# Patient Record
Sex: MALE | Race: WHITE | NOT HISPANIC OR LATINO | URBAN - METROPOLITAN AREA
[De-identification: names, ages, dates, MRNs, and addresses within clinical notes are randomized per-mention and may not be internally consistent; named-entity substitution may affect disease eponyms.]

---

## 2017-12-03 ENCOUNTER — EMERGENCY (EMERGENCY)
Facility: HOSPITAL | Age: 37
LOS: 0 days | Discharge: HOME | End: 2017-12-03
Attending: EMERGENCY MEDICINE | Admitting: EMERGENCY MEDICINE
Payer: OTHER GOVERNMENT

## 2017-12-03 VITALS
DIASTOLIC BLOOD PRESSURE: 71 MMHG | RESPIRATION RATE: 16 BRPM | SYSTOLIC BLOOD PRESSURE: 129 MMHG | HEART RATE: 86 BPM | OXYGEN SATURATION: 100 %

## 2017-12-03 VITALS — WEIGHT: 199.96 LBS | HEIGHT: 69 IN

## 2017-12-03 DIAGNOSIS — M54.5 LOW BACK PAIN: ICD-10-CM

## 2017-12-03 DIAGNOSIS — M54.31 SCIATICA, RIGHT SIDE: ICD-10-CM

## 2017-12-03 PROCEDURE — 99284 EMERGENCY DEPT VISIT MOD MDM: CPT

## 2017-12-03 PROCEDURE — 72131 CT LUMBAR SPINE W/O DYE: CPT | Mod: 26

## 2017-12-03 RX ORDER — TRAMADOL HYDROCHLORIDE 50 MG/1
1 TABLET ORAL
Qty: 8 | Refills: 0 | OUTPATIENT
Start: 2017-12-03

## 2017-12-03 RX ORDER — KETOROLAC TROMETHAMINE 30 MG/ML
60 SYRINGE (ML) INJECTION ONCE
Qty: 0 | Refills: 0 | Status: DISCONTINUED | OUTPATIENT
Start: 2017-12-03 | End: 2017-12-03

## 2017-12-03 RX ORDER — MORPHINE SULFATE 50 MG/1
4 CAPSULE, EXTENDED RELEASE ORAL ONCE
Qty: 0 | Refills: 0 | Status: DISCONTINUED | OUTPATIENT
Start: 2017-12-03 | End: 2017-12-03

## 2017-12-03 RX ADMIN — MORPHINE SULFATE 4 MILLIGRAM(S): 50 CAPSULE, EXTENDED RELEASE ORAL at 12:01

## 2017-12-03 RX ADMIN — Medication 60 MILLIGRAM(S): at 10:42

## 2017-12-03 NOTE — ED PROVIDER NOTE - MEDICAL DECISION MAKING DETAILS
As he had recent manipulation and known disc disease, will have CT evaluation.  Analgesics, muscle relaxer. Re-eval.

## 2017-12-03 NOTE — ED PROVIDER NOTE - CARE PLAN
Principal Discharge DX:	Acute right-sided low back pain with sciatica, sciatica laterality unspecified

## 2017-12-03 NOTE — ED ADULT NURSE NOTE - OBJECTIVE STATEMENT
pt presents to ED from home, alert and orientedx4, VSS afebrile, pt c/o lwoer back pain since monday, pt denies fall trauma or heavy lifting. pt states that he went to [place] had a long car ride, and played golf on his trip. pt states he has a hx of lumbar issue and has had physical therapy in the past. pt states he has taken aleve, motrin, muscle relaxors with no relief. safety maintained, pt ambulatory, will continue to monitor

## 2017-12-03 NOTE — ED PROVIDER NOTE - OBJECTIVE STATEMENT
36 y/o male presents to the ED c/o back pain, radiating down his left leg.  Pt drove to [place] last week and was active.  He woke up the next day and back began to hurt.  After making the drive home he could not take pain anymore so came to ED.  He has tried Aleve, Ibuprofen, Motrin, Flexeril without sustained relief.  Pt went to a chiropractor last week which provided temporary relief, but pain returned.  Pain radiates down left leg.  Pt notes he has chronic back pain but   [facility]  Pt has not taken anything today for pain. 38 y/o male presents to the ED c/o back pain for 1 week, with pain radiating down his left leg.  Pt drove to [place] last week for vacation and was active.  He woke up the next day and back began to hurt.  After making the drive home he could not take pain anymore so came to ED.  Denies bowel or bladder dysfunction.  He has tried Aleve, Ibuprofen, Motrin, Flexeril without sustained relief.  Pt went to a chiropractor last week which provided temporary relief, but pain returned.  Pain radiates down left leg.  Pt notes he has chronic back pain but has not had recent imaging.  [facility]  Pt has not taken anything today for pain. 36 y/o male with PMHx of lumbar disc disease presents to the ED c/o back pain for 1 week, with pain radiating down his left leg.  Pt drove to [place] last week for vacation and was active.  He woke up the next day and back began to hurt.  After making the drive home he could not take pain anymore so came to ED.  Denies bowel or bladder dysfunction.  He has tried Aleve, Ibuprofen, Motrin, Flexeril without sustained relief.  Pt went to a chiropractor last week which provided temporary relief, but pain returned.  Pain radiates down left leg.  Pt notes he has chronic back pain but has not had recent imaging.  [facility].  Pt has not taken anything today for pain.

## 2019-03-24 ENCOUNTER — EMERGENCY (EMERGENCY)
Facility: HOSPITAL | Age: 39
LOS: 0 days | Discharge: HOME | End: 2019-03-24
Attending: EMERGENCY MEDICINE | Admitting: EMERGENCY MEDICINE
Payer: OTHER GOVERNMENT

## 2019-03-24 VITALS
OXYGEN SATURATION: 98 % | RESPIRATION RATE: 16 BRPM | HEART RATE: 72 BPM | TEMPERATURE: 98 F | DIASTOLIC BLOOD PRESSURE: 94 MMHG | SYSTOLIC BLOOD PRESSURE: 126 MMHG

## 2019-03-24 VITALS — HEIGHT: 69 IN | WEIGHT: 210.1 LBS

## 2019-03-24 DIAGNOSIS — R60.0 LOCALIZED EDEMA: ICD-10-CM

## 2019-03-24 DIAGNOSIS — M79.10 MYALGIA, UNSPECIFIED SITE: ICD-10-CM

## 2019-03-24 DIAGNOSIS — Z79.899 OTHER LONG TERM (CURRENT) DRUG THERAPY: ICD-10-CM

## 2019-03-24 DIAGNOSIS — I87.2 VENOUS INSUFFICIENCY (CHRONIC) (PERIPHERAL): ICD-10-CM

## 2019-03-24 PROCEDURE — 99284 EMERGENCY DEPT VISIT MOD MDM: CPT

## 2019-03-24 PROCEDURE — 93971 EXTREMITY STUDY: CPT | Mod: 26,RT

## 2019-03-24 RX ORDER — ESOMEPRAZOLE MAGNESIUM 40 MG/1
0 CAPSULE, DELAYED RELEASE ORAL
Qty: 0 | Refills: 0 | COMMUNITY

## 2019-03-24 NOTE — ED PROVIDER NOTE - NSFOLLOWUPINSTRUCTIONS_ED_ALL_ED_FT
Follow-up with your regular physician  Leg elevation/compression stockings may help reduce swelling.  Return with any worsening/new symptoms Follow-up with your regular physician  Leg elevation/compression stockings may help reduce swelling.  Return with any worsening/new symptoms      Leg Edema    WHAT YOU NEED TO KNOW:    Leg edema is swelling caused by fluid buildup. Your legs may swell if you sit or stand for long periods of time, are pregnant, or are injured. Swelling may also occur if you have heart failure or circulation problems. This means that your heart does not pump blood through your body as it should.    DISCHARGE INSTRUCTIONS:    Self-care:   Elevate your legs: Raise your legs above the level of your heart as often as you can. This will help decrease swelling and pain. Prop your legs on pillows or blankets to keep them elevated comfortably.  Wear pressure stockings: These tight stockings put pressure on your legs to promote blood flow and prevent blood clots. Wear the stockings during the day. Do not wear them while you sleep.  Apply heat: Heat helps decrease pain and swelling. Apply heat on the area for 20 to 30 minutes every 2 hours for as many days as directed.   Stay active: Do not stand or sit for long periods of time. Ask your healthcare provider about the best exercise plan for you.  Eat healthy foods: Healthy foods include fruits, vegetables, whole-grain breads, low-fat dairy products, beans, lean meats, and fish. Ask if you need to be on a special diet. Limit salt. Salt will make your body hold even more fluid.    Follow up with your healthcare provider as directed: Write down your questions so you remember to ask them during your visits.     Contact your healthcare provider if:     You have a fever or feel more tired than usual.  The veins in your legs look larger than usual. They may look full or bulging.  Your legs itch or feel heavy.  You have red or white areas or sores on your legs. The skin may also appear dimpled or have indentations.  You are gaining weight.  You have trouble moving your ankles.  The swelling does not go away, or other parts of your body swell.  You have questions or concerns about your condition or care.    Return to the emergency department if:   You cannot walk.  You feel faint or confused.   Your skin turns blue or gray.  Your leg feels warm, tender, and painful. It may be swollen and red.  You have chest pain or trouble breathing that is worse when you lie down.  You suddenly feel lightheaded and have trouble breathing.  You have new and sudden chest pain. You may have more pain when you take deep breaths or cough. You may also cough up blood.

## 2019-03-24 NOTE — ED ADULT TRIAGE NOTE - CHIEF COMPLAINT QUOTE
Pt ambulatory to triage with c/o RLE pain, swelling x 3 weeks. Pt states if he flexes his ankle he feels "fatigue" in his calf. Advised by his sister to go to ED for evaluation. No sig PMHx per patient

## 2019-03-24 NOTE — ED PROVIDER NOTE - OBJECTIVE STATEMENT
38M without signif PMH c/o right lower extremity swelling, mild achy discomfort x 3wks. No hx PE/DVT. No risk factors.

## 2020-09-18 NOTE — ED ADULT NURSE NOTE - SCORE
Consulted for multiple wounds; per primary RN pt's prognosis poor; attempting to speak to family and palliative care involved; will return this afternoon after decisions have been made. 1

## 2021-09-22 ENCOUNTER — IMMUNIZATION (OUTPATIENT)
Dept: PHARMACY | Facility: CLINIC | Age: 41
End: 2021-09-22
Payer: OTHER GOVERNMENT

## 2021-09-22 DIAGNOSIS — Z23 NEED FOR VACCINATION: Primary | ICD-10-CM

## 2022-04-07 ENCOUNTER — OFFICE VISIT (OUTPATIENT)
Dept: SPINE | Facility: CLINIC | Age: 42
End: 2022-04-07
Payer: OTHER GOVERNMENT

## 2022-04-07 ENCOUNTER — HOSPITAL ENCOUNTER (OUTPATIENT)
Dept: RADIOLOGY | Facility: OTHER | Age: 42
Discharge: HOME | End: 2022-04-07
Attending: STUDENT IN AN ORGANIZED HEALTH CARE EDUCATION/TRAINING PROGRAM
Payer: OTHER GOVERNMENT

## 2022-04-07 ENCOUNTER — TELEPHONE (OUTPATIENT)
Dept: ADMINISTRATIVE | Facility: OTHER | Age: 42
End: 2022-04-07
Payer: OTHER GOVERNMENT

## 2022-04-07 VITALS
DIASTOLIC BLOOD PRESSURE: 104 MMHG | HEIGHT: 69 IN | SYSTOLIC BLOOD PRESSURE: 150 MMHG | WEIGHT: 207 LBS | RESPIRATION RATE: 18 BRPM | HEART RATE: 87 BPM | BODY MASS INDEX: 30.66 KG/M2 | TEMPERATURE: 98 F | OXYGEN SATURATION: 99 %

## 2022-04-07 DIAGNOSIS — M47.816 SPONDYLOSIS WITHOUT MYELOPATHY OR RADICULOPATHY, LUMBAR REGION: ICD-10-CM

## 2022-04-07 DIAGNOSIS — M79.18 MYOFASCIAL PAIN: ICD-10-CM

## 2022-04-07 DIAGNOSIS — M47.812 SPONDYLOSIS WITHOUT MYELOPATHY OR RADICULOPATHY, CERVICAL REGION: Primary | ICD-10-CM

## 2022-04-07 PROCEDURE — 72114 XR LUMBAR SPINE 5 VIEW WITH FLEX AND EXT: ICD-10-PCS | Mod: 26,,, | Performed by: RADIOLOGY

## 2022-04-07 PROCEDURE — 99215 OFFICE O/P EST HI 40 MIN: CPT | Mod: PBBFAC | Performed by: ANESTHESIOLOGY

## 2022-04-07 PROCEDURE — 99999 PR PBB SHADOW E&M-EST. PATIENT-LVL V: CPT | Mod: PBBFAC,,, | Performed by: ANESTHESIOLOGY

## 2022-04-07 PROCEDURE — 72114 X-RAY EXAM L-S SPINE BENDING: CPT | Mod: TC,FY

## 2022-04-07 PROCEDURE — 99999 PR PBB SHADOW E&M-EST. PATIENT-LVL V: ICD-10-PCS | Mod: PBBFAC,,, | Performed by: ANESTHESIOLOGY

## 2022-04-07 PROCEDURE — 72114 X-RAY EXAM L-S SPINE BENDING: CPT | Mod: 26,,, | Performed by: RADIOLOGY

## 2022-04-07 PROCEDURE — 99244 PR OFFICE CONSULTATION,LEVEL IV: ICD-10-PCS | Mod: S$PBB,,, | Performed by: ANESTHESIOLOGY

## 2022-04-07 PROCEDURE — 99244 OFF/OP CNSLTJ NEW/EST MOD 40: CPT | Mod: S$PBB,,, | Performed by: ANESTHESIOLOGY

## 2022-04-07 NOTE — PROGRESS NOTES
Chronic Pain - New Consult    Referring Physician: Kiran Collins MD    Chief Complaint:   Chief Complaint   Patient presents with    Low-back Pain     Pt states low back pain on a regular basis    Neck Pain        SUBJECTIVE:    Chito Palacios presents to the clinic for the evaluation of chronic low back and subacute neck pain. 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 His pain has fluctuated over the years but recently symptoms have been worsening.The pain is located in the low back area and radiates to the buttock. He previously had shooting pain extending down the posterior leg to the shin but this has resolved. The pain is described as aching, sharp and stabbing. Symptoms interfere with daily activity, sleeping and work. The pain is exacerbated by Night Time, Extension and Getting out of bed/chair.  The pain is mitigated by massage, medications and physical therapy. He reports spending 4 hours per day reclining. The patient reports 5 hours of uninterrupted sleep per night.    His neck pain began 2-3 months ago and is described as tight and sharp along the bilateral upper trapezius. Does not radiate to the arms or associated with weakness. This has occurred since he began predominately desk work due to his back pain. Worse with neck rotation and extension. He has not tried anything for this pain as of yet. Currently this is more bothersome than back pain.    Patient denies night fever/night sweats, urinary incontinence, bowel incontinence, significant weight loss, significant motor weakness and loss of sensations.    Physical Therapy/Home Exercise:   Yes, completed 6 weeks within the last 6 months      Pain Disability Index Review:  Last 3 PDI Scores 4/7/2022   Pain Disability Index (PDI) 19       Pain Medications:    - Adjuvant Medications: Naproxen and flexeril  - Anti-Coagulants: None     report:  Not applicable    Pain Procedures:   None    Imaging:   MRI lumbar  "spine 8/26/2021  Central disk protrusion at L5/S1 with annular tear without spinal or foraminal stenosis. Full report upload to media tab.    No past medical history on file.  No past surgical history on file.  Social History     Socioeconomic History    Marital status:    Tobacco Use    Smoking status: Former Smoker     Types: Cigarettes    Smokeless tobacco: Former User     Quit date: 2012   Substance and Sexual Activity    Alcohol use: Never     Comment: occassionally    Drug use: Never    Sexual activity: Yes     Partners: Female     Birth control/protection: None     No family history on file.    Review of patient's allergies indicates:  No Known Allergies    No current outpatient medications on file.     No current facility-administered medications for this visit.       REVIEW OF SYSTEMS:    GENERAL:  No weight loss, malaise or fevers.  HEENT:  Negative for frequent or significant headaches.  NECK:  Negative for lumps, goiter, pain and significant neck swelling.  RESPIRATORY:  Negative for cough, wheezing or shortness of breath.  CARDIOVASCULAR:  Negative for chest pain, leg swelling or palpitations.  GI:  Negative for abdominal discomfort, blood in stools or black stools or change in bowel habits.  MUSCULOSKELETAL:  See HPI.  SKIN:  Negative for lesions, rash, and itching.  PSYCH:  Negative for sleep disturbance, mood disorder and recent psychosocial stressors.  HEMATOLOGY/LYMPHOLOGY:  Negative for prolonged bleeding, bruising easily or swollen nodes.  NEURO:   No history of headaches, syncope, paralysis, seizures or tremors.  All other reviewed and negative other than HPI.    OBJECTIVE:    BP (!) 150/104 (BP Location: Left arm, Patient Position: Sitting, BP Method: X-Large (Automatic))   Pulse 87   Temp 98.1 °F (36.7 °C)   Resp 18   Ht 5' 9" (1.753 m)   Wt 93.9 kg (207 lb)   SpO2 99%   BMI 30.57 kg/m²     PHYSICAL EXAMINATION:    General appearance: Well appearing, in no acute distress, " alert and oriented x3.  Psych:  Mood and affect appropriate.  Skin: Skin color, texture, turgor normal, no rashes or lesions, in both upper and lower body.  Head/face:  Normocephalic, atraumatic. No palpable lymph nodes.  Neck: No pain to palpation over the cervical paraspinous muscles. Spurling Negative. No pain with neck flexion. Pain with extension and lateral flexion.   Cor: RRR  Pulm: CTA  GI:  Soft and non-tender.  Back: Straight leg raising in the sitting and supine positions is negative to radicular pain. Mild tenderness over the lumbar paraspinal muscles (left > right) and iliac crest. Facet joint loading positive bilaterally. Pain with extension.  Extremities: Peripheral joint ROM is full and pain free without obvious instability or laxity in all four extremities. No deformities, edema, or skin discoloration. Good capillary refill.  Musculoskeletal: Hip, sacroiliac and knee provocative maneuvers are negative. Bilateral upper and lower extremity strength is normal and symmetric.  No atrophy or tone abnormalities are noted.  Neuro: Bilateral upper and lower extremity coordination and muscle stretch reflexes are physiologic and symmetric.  Plantar response are downgoing. No loss of sensation is noted.  Gait: normal.    ASSESSMENT: 41 y.o. year old male with neck and back pain, consistent with:     1. Spondylosis without myelopathy or radiculopathy, cervical region  Ambulatory referral/consult to [facility]   2. Spondylosis without myelopathy or radiculopathy, lumbar region  Procedure Order to Pain Management    X-Ray Lumbar Complete Including Flex And Ext   3. Myofascial pain  Ambulatory referral/consult to [facility]         PLAN:     - Patient has had long standing low back pain consistent with facet arthropathy. Failed conservative measures.   - Continue current medications.  - Will schedule for bilateral L4/5 and L5/S1 facet joint injections. Patient offered MBB/RFA but declined due to  perceived invasiveness.  - Order lumbar flex/ext to r/o instability  - Referral sent to [facility] program for cervical spine strengthening and stabilization. May likely be beneficial for his low back once completed.   - RTC 2 weeks post-procedure  - Counseled patient regarding the importance of activity modification, constant sleeping habits and physical therapy.    The above plan and management options were discussed at length with patient. Patient is in agreement with the above and verbalized understanding. It will be communicated with the referring physician via electronic record, fax, or mail.    Nicolas Mcarthur  04/07/2022     I have reviewed and concur with the resident's history, physical, assessment, and plan.  I have personally interviewed and examined the patient at bedside.  See below addendum for my evaluation and additional findings.    Fab Colbert MD

## 2022-04-08 ENCOUNTER — TELEPHONE (OUTPATIENT)
Dept: ADMINISTRATIVE | Facility: OTHER | Age: 42
End: 2022-04-08
Payer: OTHER GOVERNMENT

## 2022-04-29 ENCOUNTER — HOSPITAL ENCOUNTER (OUTPATIENT)
Facility: OTHER | Age: 42
Discharge: HOME | End: 2022-04-29
Attending: ANESTHESIOLOGY | Admitting: ANESTHESIOLOGY
Payer: OTHER GOVERNMENT

## 2022-04-29 VITALS
HEIGHT: 67 IN | DIASTOLIC BLOOD PRESSURE: 88 MMHG | RESPIRATION RATE: 16 BRPM | WEIGHT: 200 LBS | HEART RATE: 89 BPM | BODY MASS INDEX: 31.39 KG/M2 | SYSTOLIC BLOOD PRESSURE: 131 MMHG | OXYGEN SATURATION: 97 % | TEMPERATURE: 99 F

## 2022-04-29 DIAGNOSIS — M47.9 SPONDYLOSIS: ICD-10-CM

## 2022-04-29 DIAGNOSIS — M47.9 OSTEOARTHRITIS OF SPINE, UNSPECIFIED SPINAL OSTEOARTHRITIS COMPLICATION STATUS, UNSPECIFIED SPINAL REGION: Primary | ICD-10-CM

## 2022-04-29 DIAGNOSIS — G89.29 CHRONIC PAIN: ICD-10-CM

## 2022-04-29 PROCEDURE — 64493 INJ PARAVERT F JNT L/S 1 LEV: CPT | Mod: 50 | Performed by: ANESTHESIOLOGY

## 2022-04-29 PROCEDURE — 64493 INJ PARAVERT F JNT L/S 1 LEV: CPT | Mod: 50,,, | Performed by: ANESTHESIOLOGY

## 2022-04-29 PROCEDURE — 64494 INJ PARAVERT F JNT L/S 2 LEV: CPT | Mod: 50 | Performed by: ANESTHESIOLOGY

## 2022-04-29 PROCEDURE — 25000003 PHARM REV CODE 250: Performed by: ANESTHESIOLOGY

## 2022-04-29 PROCEDURE — 63600175 PHARM REV CODE 636 W HCPCS: Performed by: ANESTHESIOLOGY

## 2022-04-29 PROCEDURE — 64493 PR INJ DX/THER AGNT PARAVERT FACET JOINT,IMG GUIDE,LUMBAR/SAC,1ST LVL: ICD-10-PCS | Mod: 50,,, | Performed by: ANESTHESIOLOGY

## 2022-04-29 PROCEDURE — 64494 INJ PARAVERT F JNT L/S 2 LEV: CPT | Mod: 50,,, | Performed by: ANESTHESIOLOGY

## 2022-04-29 PROCEDURE — 25500020 PHARM REV CODE 255: Performed by: ANESTHESIOLOGY

## 2022-04-29 PROCEDURE — 64494 PR INJ DX/THER AGNT PARAVERT FACET JOINT,IMG GUIDE,LUMBAR/SAC, 2ND LEVEL: ICD-10-PCS | Mod: 50,,, | Performed by: ANESTHESIOLOGY

## 2022-04-29 RX ORDER — DEXAMETHASONE SODIUM PHOSPHATE 10 MG/ML
INJECTION INTRAMUSCULAR; INTRAVENOUS
Status: DISCONTINUED | OUTPATIENT
Start: 2022-04-29 | End: 2022-04-29 | Stop reason: HOSPADM

## 2022-04-29 RX ORDER — LIDOCAINE HYDROCHLORIDE 20 MG/ML
INJECTION, SOLUTION INFILTRATION; PERINEURAL
Status: DISCONTINUED | OUTPATIENT
Start: 2022-04-29 | End: 2022-04-29 | Stop reason: HOSPADM

## 2022-04-29 RX ORDER — MIDAZOLAM HYDROCHLORIDE 1 MG/ML
INJECTION INTRAMUSCULAR; INTRAVENOUS
Status: DISCONTINUED | OUTPATIENT
Start: 2022-04-29 | End: 2022-04-29 | Stop reason: HOSPADM

## 2022-04-29 RX ORDER — BUPIVACAINE HYDROCHLORIDE 2.5 MG/ML
INJECTION, SOLUTION EPIDURAL; INFILTRATION; INTRACAUDAL
Status: DISCONTINUED | OUTPATIENT
Start: 2022-04-29 | End: 2022-04-29 | Stop reason: HOSPADM

## 2022-04-29 RX ORDER — FENTANYL CITRATE 50 UG/ML
INJECTION, SOLUTION INTRAMUSCULAR; INTRAVENOUS
Status: DISCONTINUED | OUTPATIENT
Start: 2022-04-29 | End: 2022-04-29 | Stop reason: HOSPADM

## 2022-04-29 RX ORDER — SODIUM CHLORIDE 9 MG/ML
INJECTION, SOLUTION INTRAVENOUS CONTINUOUS
Status: DISCONTINUED | OUTPATIENT
Start: 2022-04-29 | End: 2022-04-29 | Stop reason: HOSPADM

## 2022-04-29 NOTE — DISCHARGE INSTRUCTIONS
Thank you for allowing us to care for you today. You may receive a survey about the care we provided. Your feedback is valuable and helps us provide excellent care throughout the community.     Home Care Instructions for Pain Management:    1. DIET:   You may resume your normal diet today.   2. BATHING:   You may shower with luke warm water. No tub baths or anything that will soak injection sites under water for the next 24 hours.  3. DRESSING:   You may remove your bandage today.   4. ACTIVITY LEVEL:   You may resume your normal activities 24 hrs after your procedure. Nothing strenuous today.  5. MEDICATIONS:   You may resume your normal medications today. To restart blood thinners, ask your doctor.  6. DRIVING    If you have received any sedatives by mouth today, you may not drive for 12 hours.    If you have received any sedation through your IV, you may not drive for 24 hrs.   7. SPECIAL INSTRUCTIONS:   No heat to the injection site for 24 hrs including, hot bath or shower, heating pad, moist heat, or hot tubs.    Use ice pack to injection site for any pain or discomfort.  Apply ice packs for 20 minute intervals as needed.    IF you have diabetes, be sure to monitor your blood sugar more closely. IF your injection contained steroids your blood sugar levels may become higher than normal.    If you are still having pain upon discharge:  Your pain may improve over the next 48 hours. The anesthetic (numbing medication) works immediately to 48 hours. IF your injection contained a steroid (anti-inflammatory medication), it takes approximately 3 days to start feeling relief and 7-10 days to see your greatest results from the medication. It is possible you may need subsequent injections. This would be discussed at your follow up appointment with pain management or your referring doctor.    Please call the PAIN MANAGEMENT office at [phone] or ON CALL pager at [phone] if you experienced any:   -Weakness or  loss of sensation  -Fever > 101.5  -Pain uncontrolled with oral medications   -Persistent nausea, vomiting, or diarrhea  -Redness or drainage from the injection sites, or any other worrisome concerns.   If physician on call was not reached or could not communicate with our office for any reason please go to the nearest emergency department.     Adult Procedural Sedation Instructions    Recovery After Procedural Sedation (Adult)  You have been given medicine by vein to make you sleep during your surgery. This may have included both a pain medicine and sleeping medicine. Most of the effects have worn off. But you may still have some drowsiness for the next 6 to 8 hours.  Home care  Follow these guidelines when you get home:  For the next 8 hours, you should be watched by a responsible adult. This person should make sure your condition is not getting worse.  Don't drink any alcohol for the next 24 hours.  Don't drive, operate dangerous machinery, or make important business or personal decisions during the next 24 hours.  Note: Your healthcare provider may tell you not to take any medicine by mouth for pain or sleep in the next 4 hours. These medicines may react with the medicines you were given in the hospital. This could cause a much stronger response than usual.  Follow-up care  Follow up with your healthcare provider if you are not alert and back to your usual level of activity within 12 hours.  When to seek medical advice  Call your healthcare provider right away if any of these occur:  Drowsiness gets worse  Weakness or dizziness gets worse  Repeated vomiting  You can't be awakened   Date Last Reviewed: 10/18/2016  © 4687-4320 [employer]. [address] [place] [address], [place] [address]. All rights reserved. This information is not intended as a substitute for professional medical care. Always follow your healthcare professional's instructions.

## 2022-04-29 NOTE — OP NOTE
Therapeutic Lumbar Facet Joint Injection under Fluoroscopy     The procedure, risks, benefits, and options were discussed with the patient. There are no contraindications to the procedure. The patent expressed understanding and agreed to the procedure. Informed written consent was obtained prior to the start of the procedure and can be found in the patient's chart.    PATIENT NAME: Chito Palacios   MRN: [id number]     DATE OF PROCEDURE: 04/29/2022    PROCEDURE: Bilateral L4/5 and L5/S1 Lumbar Facet Joint Injection under Fluoroscopy      PRE-OP DIAGNOSIS: Spondylosis without myelopathy or radiculopathy, lumbar region [M47.816] Lumbar spondylosis [M47.816]    POST-OP DIAGNOSIS: Same    PHYSICIAN: Fab Colbert MD    ASSISTANTS: Lm Weiss MD    MEDICATIONS INJECTED: Preservative-free Decadron 10mg with 1cc of Bupivacaine 0.25%    LOCAL ANESTHETIC INJECTED: Xylocaine 2%     SEDATION:   Versed 2mg and Fentanyl 50mcg                                                                                                                                                                                     Conscious sedation ordered by M.D. Patient re-evaluation prior to administration of conscious sedation. No changes noted in patient's status from initial evaluation. The patient's vital signs were monitored by RN and patient remained hemodynamically stable throughout the procedure.    Event Time In   Sedation Start 1015       ESTIMATED BLOOD LOSS: None    COMPLICATIONS: None    TECHNIQUE: Time-out was performed to identify the patient and procedure to be performed. With the patient laying in a prone position, the surgical area was prepped and draped in the usual sterile fashion using ChloraPrep and a fenestrated drape. The level was determined under fluoroscopy guidance. Skin anesthesia was achieved by injecting Lidocaine 2% over the injection sites. A 25 gauge, 3.5 inch needle was introduced to each level using AP, lateral and/or  contralateral oblique fluoroscopic imaging. Contrast dye, Omnipaque (300mg/ml), was given until dye spread was in the distribution of the facet joint without any intravascular spread. After negative aspiration for blood or CSF was confirmed, 1 mL of the medication mixture listed above was injected slowly into each joint with displacement of the contrast confirming that the medication went into the distribution of the facet joints. The needles were removed and bleeding was nil. A sterile dressing was applied. No specimens collected. The patient tolerated the procedure well.    The patient was monitored after the procedure in the recovery area. They were given post-procedure and discharge instructions to follow at home. The patient was discharged in a stable condition.    I reviewed and edited the fellow's note. I conducted my own interview and physical examination. I agree with the findings. I was present and supervising all critical portions of the procedure.    Salvador Weiss MD    I have reviewed the notes, assessments, and/or procedures performed by fellow, I concur with her/his documentation of Chito Palaciso.    Fab Colbert MD

## 2022-04-29 NOTE — DISCHARGE SUMMARY
Discharge Note  Short Stay      SUMMARY     Admit Date: 4/29/2022    Attending Physician: Salvador Weiss      Discharge Physician: Salvador Weiss      Discharge Date: 4/29/2022 10:20 AM    Procedure(s) (LRB):  INJECTION, FACET JOINT, BILATERAL, L4-L5 and L5-S1 (Bilateral)    Final Diagnosis: Spondylosis without myelopathy or radiculopathy, lumbar region [M47.816]    Disposition: Home or self care    Patient Instructions:   There are no discharge medications for this patient.          Discharge Diagnosis: Spondylosis without myelopathy or radiculopathy, lumbar region [M47.816]  Condition on Discharge: Stable with no complications to procedure   Diet on Discharge: Same as before.  Activity: as per instruction sheet.  Discharge to: Home with a responsible adult.  Follow up: 2-4 weeks       Please call my office or pager at [phone] if experienced any weakness or loss of sensation, fever > 101.5, pain uncontrolled with oral medications, persistent nausea/vomiting/or diarrhea, redness or drainage from the incisions, or any other worrisome concerns. If physician on call was not reached or could not communicate with our office for any reason please go to the nearest emergency department        Salvador Weiss MD

## 2022-05-17 ENCOUNTER — CLINICAL SUPPORT (OUTPATIENT)
Dept: REHABILITATION | Facility: HOSPITAL | Age: 42
End: 2022-05-17
Attending: STUDENT IN AN ORGANIZED HEALTH CARE EDUCATION/TRAINING PROGRAM
Payer: OTHER GOVERNMENT

## 2022-05-17 ENCOUNTER — DOCUMENTATION ONLY (OUTPATIENT)
Dept: REHABILITATION | Facility: HOSPITAL | Age: 42
End: 2022-05-17

## 2022-05-17 DIAGNOSIS — R29.898 DECREASED RANGE OF MOTION OF NECK: ICD-10-CM

## 2022-05-17 DIAGNOSIS — M79.18 MYOFASCIAL PAIN: ICD-10-CM

## 2022-05-17 DIAGNOSIS — M47.812 SPONDYLOSIS WITHOUT MYELOPATHY OR RADICULOPATHY, CERVICAL REGION: ICD-10-CM

## 2022-05-17 PROCEDURE — 97110 THERAPEUTIC EXERCISES: CPT | Mod: PN | Performed by: PHYSICAL MEDICINE & REHABILITATION

## 2022-05-17 PROCEDURE — 97162 PT EVAL MOD COMPLEX 30 MIN: CPT | Mod: PN | Performed by: PHYSICAL MEDICINE & REHABILITATION

## 2022-05-17 NOTE — PROGRESS NOTES
Physical therapist and physical therapy assistant(s) met face to face to discuss patient's treatment plan and progress towards established goals. Pt will be seen by a physical therapist minimally every 6th visit or every 30 days.    Adeola Koehler, PT

## 2022-05-17 NOTE — PLAN OF CARE
[facility] - PHYSICAL THERAPY EVALUATION     Name: Chito Palacios  Clinic Number: [id number]    Therapy Diagnosis:   Encounter Diagnoses   Name Primary?    Spondylosis without myelopathy or radiculopathy, cervical region     Myofascial pain     Decreased range of motion of neck      Physician: Nicolas Mcarthur MD    Physician Orders: PT Eval and Treat    Medical Diagnosis from Referral:   M47.812 (ICD-10-CM) - Spondylosis without myelopathy or radiculopathy, cervical region   M79.18 (ICD-10-CM) - Myofascial pain       Evaluation Date: 5/17/2022  Authorization Period Expiration: 4/7/23  Plan of Care Expiration: 8/17/22  Reassessment Due: 6/17/22  Visit # / Visits authorized: 1/ 1    Time In: 10:30 am  Time Out: 12:04 pm  Total Billable Time: 90 minutes    Precautions:  Standard    Pattern of pain determined: 1 REP      Subjective   Date of onset: 6 months ago for neck, long standing for back  History of current condition - Chito reports: 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  Back pain still present.  [facility]   He is here now for neck. Neck pain started about 6 months ago, he noted stiffness first.  Stiffness progressed and then he started to have shooting pain in his neck.  He has seen his doctor, he saw the chiropractor.  He was told his discs were compressed by the chiropractor.  Neck pain is concentrated in base of neck.  No arm symptoms.  Neck pain is constant, 6/10 at worst, 2/10 now.  2/10 at best.  Worst in the morning, and stiff.  Loosens up once he moves around a little.  Worse with sitting at a desk.  He works for [employer] and sits at desk all day.  The pain is described as aching, sharp and stabbing.      His neck pain began 2-3 months ago and is described as tight and sharp along the bilateral upper trapezius. Does not radiate to the arms or associated with weakness. This has occurred  since he began predominately desk work due to his back pain. Worse with neck rotation and extension. He has not tried anything for this pain as of yet. Currently this is more bothersome than back pain.     Patient denies night fever/night sweats, urinary incontinence, bowel incontinence, significant weight loss, significant motor weakness and loss of sensations.     Physical Therapy/Home Exercise:   Yes, completed 6 weeks within the last 6 months       Medical History:   No past medical history on file.    Surgical History:   Chito Palacios  has a past surgical history that includes Injection of facet joint (Bilateral, 4/29/2022).    Medications:   Chito currently has no medications in their medication list.    Allergies:   Review of patient's allergies indicates:  No Known Allergies       Prior Therapy: yes  Prior Treatment: injections  Social History: lives here, wife and son in [place], he travels a fair bit  Occupation: [employer], sits at desk most of day  Leisure: golf, few weeks ago, seems ok      Prior Level of Function: full  Current Level of Function: full  DME owned/used:  [employer]        Pain:  Current 2/10, worst 6/10, best 2/10   Location: bilateral neck   Description: Aching and Sharp  Aggravating Factors: Morning and Extension  Easing Factors: rest  Disturbed Sleep: no        Pattern of pain questions:  1.  Where is your pain the worst? neck  2.  Is your pain constant or intermittent? constant  3.  Does bending forward make your typical pain worse? yes  4.  Since the start of your neck pain, has there been a change in your bowel or bladder? no  5.  What can't you do now that you use to be able to do? Be pain free, works through pain      Pts goals: reduce pain      Red Flag Screening:   Cough  Sneeze  Strain: (--)  Bladder/ bowel: (--)  Falls: (--)  Night pain: (--)  Unexplained weight loss: (--)  General health: good    OBJECTIVE   Postural examination/scapula alignment:  Rounded shoulder and Head forward  Sitting: very poor posture  Standing: rounded shoulders  Correction of posture: better with lumbar roll    Range of Motion - MOVEMENT LOSS    ROM Loss   Flexion moderate loss   Extension major loss 42 degrees   Side bending Right major loss 20 degrees   Side bending Left major loss 18 degrees   Rotation Right moderate loss 60 degrees   Rotation Left moderate loss 61 degrees   Protraction minimal loss   Retraction  major loss       Upper Extremity Strength  (R) UE  (L) UE    Shoulder flexion: 5/5 Shoulder flexion: 5/5   Shoulder Abduction: 5/5 Shoulder abduction: 5/5   Elbow flexion: 5/5 Elbow flexion: 5/5   Elbow extension: 5/5 Elbow extension: 5/5   Wrist flexion: 5/5 Wrist flexion: 5/5   Wrist extension: 5/5 Wrist extension: 5/5    5/5 : 5/5     LE 5/5 strength  Single leg stance 10 sec  Functional screening full    NEUROLOGICAL SCREEN    Sensory deficit: intact    Special Tests:   Test Name  Testing Result   Compression (+)   Distraction (--)   Neural Tension Test (--)   Saddle Sensation (--)     Reflexes:    Left Right   Biceps  2+ 2+   Brachioradialis  2+ 2+   Clonus (--) (--)   Babinski (--) (--)       REPEATED TEST MOVEMENTS: neck pain 2/10 with movement loss rotation and side bend  Repeated Protraction in Sitting end range pain  pain during motion  no worse   Repeated Flexion in Sitting end range pain  no worse   Repeated Retraction in Sitting  end range pain  no worse   Repeated Retraction Extension in Sitting end range pain  no worse   Repeated retraction prone on stomach slightly reduced pain and noted improved mobility   Repeated thoracic ext in sitting end range pain  no worse       Baseline Isometric Testing on Med X equipment:  Testing administered by PT  Date of testin22  ROM 36-90 deg   Max Peak Torque 156    Min Peak Torque 116    Flex/Ext Ratio 1.3/1   % below normative data -58%     [facility] 2022   Visit Number 1   VAS Pain Rating  6   Retraction in Sitting 10   Retraction with Extension 5   Cervical Extension Seat Pad 0   Seat Adjustment 401   Top Dead Center 66   Counterweight 1.1   Cervical Flexion 90   Cervical Extension 36   Cervical Peak Torque 156   Ice - Z Lie (in min.) 10       GAIT:  Assistive Device used: none  Level of Assistance: independent  Patient displays the following gait deviations:  no gait deviations observed.         Limitation/Restriction for FOTO 5/17/22 Survey    Therapist reviewed FOTO scores for Chito Palacios on 5/17/2022.   FOTO documents entered into [employer] - see Media section.    Limitation Score: 49% limited  Goal < 35% limited             Treatment   Treatment Time In: 11:30 am  Treatment Time Out: 12:00 pm  Total Treatment time separate from Evaluation: 30 minutes      Chito received therapeutic exercises to develop/improve posture, lumbar/cervical ROM, strength and muscular endurance for 30 minutes including the following exercises:   Med x dynamic exercise and baseline IM testing        Written Home Exercises Provided: yes.  Stretches at work 2/day:  Neck retractions 10 reps  Neck retraction ext 5 reps  Thoracic extensions 10 reps over chair  At home 1/day:  Open books 10 reps  Prone retraction on elbows for neck retraction    Exercises were reviewed and Chito was able to demonstrate them prior to the end of the session.  Chito demonstrated good  understanding of the education provided.     See EMR under Patient Instructions for exercises provided 5/17/2022.      Education provided:   - Patient received education regarding proper posture and body mechanics.  Patient was given top [facility] Visit 1 handouts which discuss what to expect in therapy, the purpose and opportunity for health coaching, the program,  wellness when discharged from therapy, back education and care specifically for posture seated, standing, lifting correctly, components of exercise, importance of nutrition and hydration, and  importance of sleep.   Information on lumbar rolls provided.  Patient received education regarding proper posture and body mechanics.  - Keiko roll tried, recommended, and purchase information was provided.    - Patient received a handout regarding anticipated muscular soreness following the isometric test and strategies for management were reviewed with patient including stretching, using ice and scheduled rest.   - Patient received education on the [facility] program, purpose of the isometric test, progression of neck strengthening as well as wellness approach and systemic strengthening.  Details of the program were discussed.  Reviewed that patient should feel support/pressure from med ex restraints but no pain or discomfort and patient expressed understanding.      Chito received cold pack for 10 minutes to back and neck.    Assessment   Chito is a 41 y.o. male referred to [facility] with a medical diagnosis of   M47.812 (ICD-10-CM) - Spondylosis without myelopathy or radiculopathy, cervical region   M79.18 (ICD-10-CM) - Myofascial pain     Pt presents with neck dominant constant pain   that is worse in the morning, with sitting, and stiffness all planes of motion.  Neck pain slightly reduced and motion improved with retraction extension sequencing, especially prone neck retraction on elbows.   Indicating directional preference of retraction extension.   Upon physical assessment, pt demonstrates slouched posturing in sitting, neck weakness, trunk postural weakness,  and trunk  mobility deficits. Upon further local examination,thoracic rotation was limited significantly. Per cervical  MedX testing, pt was 58% below average in strength when compared to those of  same age/height/weight/gender. All of the above noted supports potential lumbar classification as a pattern 1 REP with recurrent/ or consistent symptoms, thus pt is a good candidate for the [facility] Program. Pt would benefit from neck and  trunk mobility training, stability training,  improved cardiovascular and muscular endurance, neuromuscular re-education for posture, coordination, and muscular recruitment and education on positional offloading techniques to decrease the intensity and frequency of flare-ups.    Pain Pattern: 1 REP       Pt prognosis is Good.   Pt will benefit from skilled outpatient Physical Therapy to address the deficits stated above and in the chart below, provide pt/family education, and to maximize pt's level of independence.     Plan of care discussed with patient: Yes  Pt's spiritual, cultural and educational needs considered and patient is agreeable to the plan of care and goals as stated below:     Anticipated Barriers for therapy: attending with work schedule    PT Evaluation Completed? Yes    Medical necessity is demonstrated by the following problem list.    Pt presents with the following impairments:     History  Co-morbidities and personal factors that may impact the plan of care Co-morbidities:   chronic pain    Personal Factors:   work style     moderate   Examination  Body Structures and Functions, activity limitations and participation restrictions that may impact the plan of care Body Regions:   neck    Body Systems:    ROM  strength  transfers  transitions  motor control    Participation Restrictions:   attending    Activity limitations:   Learning and applying knowledge  no deficits    General Tasks and Commands  no deficits    Communication  no deficits    Mobility  moving around using equipment (WC)  using transportation (bus, train, plane, car)  driving (bike, car, motorcycle)    Self care  looking after one's health    Domestic Life  doing house work (cleaning house, washing dishes, laundry)    Interactions/Relationships  no deficits    Life Areas  no deficits    Community and Social Life  no deficits         moderate   Clinical Presentation evolving clinical presentation with changing clinical  characteristics moderate   Decision Making/ Complexity Score: moderate       GOALS: Pt is in agreement with the following goals.    Short term goals: 6 weeks or 10 visits   1.  Pt will demonstratte increased cervical ROM as measured by med ex by 6 degrees from initial test which results in improved  ROM of neck for ease with ADLs and driving  (approp and ongoing)  2. Pt will demonstrate independence with reducing or controlling symptoms with ther ex, movement, or position independently, able to reduce pain 1-2 points on pain scale using strategies taught in therapy  (approp and ongoing)  3. Pt will demonstrate increased MedX average isometric strength value by 20% with  when compared to the initial testing resulting in improved ability to perform bending, lifting, and carrying activities safely, confidently.  (approp and ongoing)        Long term goals: 10 weeks or 20 visits  1. Pt will demonstratte increased cervical ROM as measured by med ex by 12 degrees from initial test which results in functional ROM of neck for ease with ADLs and driving  (approp and ongoing)  2. Pt will demonstrate increased MedX average isometric strength value  by 40% from initial test to improve ability to lift and carry, and sustain good posture while performing ADL's  (approp and ongoing)  3.Pt will demonstrate reduced pain and improved functional outcomes as reported on the FOTO by reaching a limitation score of < or = 35% or less in order to demonstrate subjective improvement in pt's condition.    (approp and ongoing)  4. Pt will demonstrate independence with reducing or controlling symptoms with ther ex, movement, or position independently, able to reduce pain 2-4 points on pain scale using strategies taught in therapy  (approp and ongoing)  5. Pt will demonstrate independence with the HEP at discharge.   (approp and ongoing)  6.  Reduce pain to intermittent and 4/10 at worst  (approp and ongoing)    Plan   Outpatient physical  "therapy 2x week for 10 weeks or 20 visits to include the following:   - Patient education  - Therapeutic exercise  - Manual therapy  - Performance testing   - Neuromuscular Re-education  - Therapeutic activity   - Modalities    Pt may be seen by PTA as part of the rehabilitation team.     Therapist: Adeola Koehler, PT  5/17/2022      "I certify the need for these services furnished under this plan of treatment and while under my care."    ____________________________________  Physician/Referring Practitioner    _______________  Date of Signature          "

## 2022-05-18 ENCOUNTER — TELEPHONE (OUTPATIENT)
Dept: PAIN MEDICINE | Facility: CLINIC | Age: 42
End: 2022-05-18
Payer: OTHER GOVERNMENT

## 2022-05-18 NOTE — TELEPHONE ENCOUNTER
This message is for patient in regards to his/her appointment 05/19/22 at 9:40am      [facility] Policy: For the safety of all patients and staff members.     Patient Visitor policy: During this visit we're asking all patients to only have one visitor over the age of 18yrs old to accompany to be seen by Luis Enrique Gillespie NP. If patient do not required assistance with their visit, we're asking all visitors to remain outside the waiting area.    Upon arriving to your schedule appointment; patients are required to wear a face mask, if patient do not have a face mask one will be provided entering the facility. If you have any questions or concerns please contact [phone]

## 2022-05-19 ENCOUNTER — OFFICE VISIT (OUTPATIENT)
Dept: PAIN MEDICINE | Facility: CLINIC | Age: 42
End: 2022-05-19
Payer: OTHER GOVERNMENT

## 2022-05-19 VITALS
HEART RATE: 79 BPM | HEIGHT: 67 IN | TEMPERATURE: 98 F | WEIGHT: 195.31 LBS | DIASTOLIC BLOOD PRESSURE: 93 MMHG | SYSTOLIC BLOOD PRESSURE: 133 MMHG | BODY MASS INDEX: 30.65 KG/M2

## 2022-05-19 DIAGNOSIS — M54.50 CHRONIC MIDLINE LOW BACK PAIN WITHOUT SCIATICA: Primary | ICD-10-CM

## 2022-05-19 DIAGNOSIS — G89.29 CHRONIC MIDLINE LOW BACK PAIN WITHOUT SCIATICA: Primary | ICD-10-CM

## 2022-05-19 PROCEDURE — 99213 OFFICE O/P EST LOW 20 MIN: CPT | Mod: PBBFAC | Performed by: NURSE PRACTITIONER

## 2022-05-19 PROCEDURE — 99214 OFFICE O/P EST MOD 30 MIN: CPT | Mod: S$PBB,,, | Performed by: NURSE PRACTITIONER

## 2022-05-19 PROCEDURE — 99214 PR OFFICE/OUTPT VISIT, EST, LEVL IV, 30-39 MIN: ICD-10-PCS | Mod: S$PBB,,, | Performed by: NURSE PRACTITIONER

## 2022-05-19 PROCEDURE — 99999 PR PBB SHADOW E&M-EST. PATIENT-LVL III: CPT | Mod: PBBFAC,,, | Performed by: NURSE PRACTITIONER

## 2022-05-19 PROCEDURE — 99999 PR PBB SHADOW E&M-EST. PATIENT-LVL III: ICD-10-PCS | Mod: PBBFAC,,, | Performed by: NURSE PRACTITIONER

## 2022-05-19 RX ORDER — LISINOPRIL 20 MG/1
20 TABLET ORAL DAILY
COMMUNITY
Start: 2022-02-26

## 2022-05-19 RX ORDER — NAPROXEN 500 MG/1
TABLET ORAL
COMMUNITY
Start: 2021-10-04

## 2022-05-19 NOTE — PROGRESS NOTES
Chronic Pain - New Consult    Referring Physician: No ref. provider found    Chief Complaint:   No chief complaint on file.       SUBJECTIVE:    Interval History 5/19/2022:  Mr Palacios presents for follow up of axial lower back pain.  He is status post bilateral L4 -5 and L5-S1 facet joint injection without any focal benefit not even temporary.  He denies any radicular pain.  He has just started physical therapy like to continue this before considering further procedures. There is no focal voicing of loss of b/b or saddle paresthesias.     Initial HPI:  Chito Palacios presents to the clinic for the evaluation of chronic low back and subacute neck pain. 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 His pain has fluctuated over the years but recently symptoms have been worsening.The pain is located in the low back area and radiates to the buttock. He previously had shooting pain extending down the posterior leg to the shin but this has resolved. The pain is described as aching, sharp and stabbing. Symptoms interfere with daily activity, sleeping and work. The pain is exacerbated by Night Time, Extension and Getting out of bed/chair.  The pain is mitigated by massage, medications and physical therapy. He reports spending 4 hours per day reclining. The patient reports 5 hours of uninterrupted sleep per night.    His neck pain began 2-3 months ago and is described as tight and sharp along the bilateral upper trapezius. Does not radiate to the arms or associated with weakness. This has occurred since he began predominately desk work due to his back pain. Worse with neck rotation and extension. He has not tried anything for this pain as of yet. Currently this is more bothersome than back pain.    Patient denies night fever/night sweats, urinary incontinence, bowel incontinence, significant weight loss, significant motor weakness and loss of sensations.    Physical Therapy/Home  Exercise:   Yes, completed 6 weeks within the last 6 months      Pain Disability Index Review:  Last 3 PDI Scores 5/19/2022 4/7/2022   Pain Disability Index (PDI) 16 19       Pain Medications:    - Adjuvant Medications: Naproxen and flexeril  - Anti-Coagulants: None     report:  Not applicable    Pain Procedures:   4/29/2022 B L4-5&L5-S1 Facet joint injection     Imaging:   MRI lumbar spine 8/26/2021  Central disk protrusion at L5/S1 with annular tear without spinal or foraminal stenosis. Full report upload to media tab.    History reviewed. No pertinent past medical history.  Past Surgical History:   Procedure Laterality Date    INJECTION OF FACET JOINT Bilateral 4/29/2022    Procedure: INJECTION, FACET JOINT, BILATERAL, L4-L5 and L5-S1;  Surgeon: Fab Colbert MD;  [facility];  Service: Pain Management;  Laterality: Bilateral;     Social History     Socioeconomic History    Marital status:    Tobacco Use    Smoking status: Former Smoker     Types: Cigarettes    Smokeless tobacco: Former User     Quit date: 2012   Substance and Sexual Activity    Alcohol use: Never     Comment: occassionally    Drug use: Never    Sexual activity: Yes     Partners: Female     Birth control/protection: None     No family history on file.    Review of patient's allergies indicates:  No Known Allergies    Current Outpatient Medications   Medication Sig    lisinopriL (PRINIVIL,ZESTRIL) 20 MG tablet Take 20 mg by mouth once daily.    naproxen (NAPROSYN) 500 MG tablet      No current facility-administered medications for this visit.       REVIEW OF SYSTEMS:    GENERAL:  No weight loss, malaise or fevers.  HEENT:  Negative for frequent or significant headaches.  NECK:  Negative for lumps, goiter, pain and significant neck swelling.  RESPIRATORY:  Negative for cough, wheezing or shortness of breath.  CARDIOVASCULAR:  Negative for chest pain, leg swelling or palpitations.  GI:  Negative for abdominal  "discomfort, blood in stools or black stools or change in bowel habits.  MUSCULOSKELETAL:  See HPI.  SKIN:  Negative for lesions, rash, and itching.  PSYCH:  Negative for sleep disturbance, mood disorder and recent psychosocial stressors.  HEMATOLOGY/LYMPHOLOGY:  Negative for prolonged bleeding, bruising easily or swollen nodes.  NEURO:   No history of headaches, syncope, paralysis, seizures or tremors.  All other reviewed and negative other than HPI.    OBJECTIVE:    BP (!) 133/93 (BP Location: Left arm, Patient Position: Sitting, BP Method: Medium (Automatic))   Pulse 79   Temp 98.3 °F (36.8 °C)   Ht 5' 7" (1.702 m)   Wt 88.6 kg (195 lb 5.2 oz)   BMI 30.59 kg/m²     PHYSICAL EXAMINATION:    General appearance: Well appearing, in no acute distress, alert and oriented x3.  Psych:  Mood and affect appropriate.  Skin: Skin color, texture, turgor normal, no rashes or lesions, in both upper and lower body.  Head/face:  Normocephalic, atraumatic. No palpable lymph nodes.  Neck: No pain to palpation over the cervical paraspinous muscles. Spurling Negative. No pain with neck flexion. Pain with extension and lateral flexion.   Cor: RRR  Pulm: CTA  GI:  Soft and non-tender.  Back: Straight leg raising in the sitting and supine positions is negative to radicular pain. Mild tenderness over the lumbar paraspinal muscles (left > right) and iliac crest. Facet joint loading positive bilaterally. Pain with extension.  Extremities: Peripheral joint ROM is full and pain free without obvious instability or laxity in all four extremities. No deformities, edema, or skin discoloration. Good capillary refill.  Musculoskeletal: Hip, sacroiliac and knee provocative maneuvers are negative. Bilateral upper and lower extremity strength is normal and symmetric.  No atrophy or tone abnormalities are noted.  Neuro: Bilateral upper and lower extremity coordination and muscle stretch reflexes are physiologic and symmetric.  Plantar response are " downgoing. No loss of sensation is noted.  Gait: normal.    ASSESSMENT: 41 y.o. year old male with neck and back pain, consistent with:     No diagnosis found.      PLAN:     - Patient has had long standing low back pain consistent with facet arthropathy. Failed conservative measures.   - s/p B L4-5&L5-S1 Facet joint injection without even temporary relief  - He does have annular tear at L5/S1 could consider targeted CAREN  - Pt wished to defer at this time and requested PT order to address lumbar pain  - Counseled patient regarding the importance of activity modification, constant sleeping habits and physical therapy.  - RTC 8 weeks or sooner for re-evaluation.     The above plan and management options were discussed at length with patient. Patient is in agreement with the above and verbalized understanding. It will be communicated with the referring physician via electronic record, fax, or mail.    Luis Enrique Gillespie  05/19/2022     I spent a total of 30 minutes on the day of the visit.  This includes face to face time and non-face to face time preparing to see the patient by reviewing previous labs/imaging, obtaining and/or reviewing separately obtained history, documenting clinical information in the electronic or other health record, independently interpreting results and communicating results to the patient/family/caregiver.

## 2022-06-23 ENCOUNTER — DOCUMENTATION ONLY (OUTPATIENT)
Dept: REHABILITATION | Facility: HOSPITAL | Age: 42
End: 2022-06-23
Payer: OTHER GOVERNMENT

## 2022-06-23 NOTE — PROGRESS NOTES
Called pt in regards to appt later today, and he stated he was unaware that he had anything scheduled. He is currently on vacation and won't be back until next week. Pt was instructed to call to get back on the schedule once he returns.    Brigette Arnold, PTA  06/23/2022

## 2022-07-18 ENCOUNTER — TELEPHONE (OUTPATIENT)
Dept: PAIN MEDICINE | Facility: CLINIC | Age: 42
End: 2022-07-18
Payer: OTHER GOVERNMENT

## 2022-07-18 NOTE — TELEPHONE ENCOUNTER
Patient was contacted staff left a message on VM informing patient that his appt tomorrow would be cancelled due to the provider being on the [facility].

## 2022-07-22 ENCOUNTER — TELEPHONE (OUTPATIENT)
Dept: PAIN MEDICINE | Facility: CLINIC | Age: 42
End: 2022-07-22
Payer: OTHER GOVERNMENT

## 2022-07-22 NOTE — TELEPHONE ENCOUNTER
This message is for patient in regards to his/her appointment 07/25//22 at 1:00p  With  JOVANY Bustillos.      [facility] Policy: For the safety of all patients and staff members.     Patient Visitor policy: During this visit we're informing all patients that face mask are now optional, upon visit you have the options of requesting clinical staff to wear a mask for your protection and thiers.      If you have any questions or concerns please contact [phone]      Pt verbalized understanding and has confirmed appt

## 2022-07-25 ENCOUNTER — OFFICE VISIT (OUTPATIENT)
Dept: PAIN MEDICINE | Facility: CLINIC | Age: 42
End: 2022-07-25
Payer: OTHER GOVERNMENT

## 2022-07-25 VITALS
HEIGHT: 67 IN | DIASTOLIC BLOOD PRESSURE: 98 MMHG | RESPIRATION RATE: 18 BRPM | SYSTOLIC BLOOD PRESSURE: 139 MMHG | BODY MASS INDEX: 29.66 KG/M2 | HEART RATE: 76 BPM | WEIGHT: 189 LBS

## 2022-07-25 DIAGNOSIS — M51.36 ANNULAR TEAR OF LUMBAR DISC: ICD-10-CM

## 2022-07-25 DIAGNOSIS — M51.9 LUMBAR DISC DISEASE: Primary | ICD-10-CM

## 2022-07-25 PROCEDURE — 99999 PR PBB SHADOW E&M-EST. PATIENT-LVL III: CPT | Mod: PBBFAC,,, | Performed by: NURSE PRACTITIONER

## 2022-07-25 PROCEDURE — 99213 OFFICE O/P EST LOW 20 MIN: CPT | Mod: PBBFAC | Performed by: NURSE PRACTITIONER

## 2022-07-25 PROCEDURE — 99214 PR OFFICE/OUTPT VISIT, EST, LEVL IV, 30-39 MIN: ICD-10-PCS | Mod: S$PBB,,, | Performed by: NURSE PRACTITIONER

## 2022-07-25 PROCEDURE — 99999 PR PBB SHADOW E&M-EST. PATIENT-LVL III: ICD-10-PCS | Mod: PBBFAC,,, | Performed by: NURSE PRACTITIONER

## 2022-07-25 PROCEDURE — 99214 OFFICE O/P EST MOD 30 MIN: CPT | Mod: S$PBB,,, | Performed by: NURSE PRACTITIONER

## 2022-07-25 NOTE — PROGRESS NOTES
Chronic Pain -Established FU    Referring Physician: No ref. provider found    Chief Complaint:   Chief Complaint   Patient presents with    Low-back Pain        SUBJECTIVE:    Interval History 7/25/2022:  Mr Palacios presents for follow up. Over interval he has not been able to perform PT due to being out of town. He would like to restart this but also interested in procedures. Denies neurological changes. Denies any loss of b/b or saddle paresthesias.     Interval History 5/19/2022:  Mr Palacios presents for follow up of axial lower back pain.  He is status post bilateral L4 -5 and L5-S1 facet joint injection without any focal benefit not even temporary.  He denies any radicular pain.  He has just started physical therapy like to continue this before considering further procedures. There is no focal voicing of loss of b/b or saddle paresthesias.     Initial HPI:  Chito Palacios presents to the clinic for the evaluation of chronic low back and subacute neck pain. 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 His pain has fluctuated over the years but recently symptoms have been worsening.The pain is located in the low back area and radiates to the buttock. He previously had shooting pain extending down the posterior leg to the shin but this has resolved. The pain is described as aching, sharp and stabbing. Symptoms interfere with daily activity, sleeping and work. The pain is exacerbated by Night Time, Extension and Getting out of bed/chair.  The pain is mitigated by massage, medications and physical therapy. He reports spending 4 hours per day reclining. The patient reports 5 hours of uninterrupted sleep per night.    His neck pain began 2-3 months ago and is described as tight and sharp along the bilateral upper trapezius. Does not radiate to the arms or associated with weakness. This has occurred since he began predominately desk work due to his back pain. Worse with neck  rotation and extension. He has not tried anything for this pain as of yet. Currently this is more bothersome than back pain.    Patient denies night fever/night sweats, urinary incontinence, bowel incontinence, significant weight loss, significant motor weakness and loss of sensations.    Physical Therapy/Home Exercise:   Yes, completed 6 weeks within the last 6 months      Pain Disability Index Review:  Last 3 PDI Scores 7/25/2022 5/19/2022 4/7/2022   Pain Disability Index (PDI) 28 16 19       Pain Medications:    - Adjuvant Medications: Naproxen and flexeril  - Anti-Coagulants: None     report:  Not applicable    Pain Procedures:   4/29/2022 B L4-5&L5-S1 Facet joint injection     Imaging:   MRI lumbar spine 8/26/2021  Central disk protrusion at L5/S1 with annular tear without spinal or foraminal stenosis. Full report upload to media tab.    History reviewed. No pertinent past medical history.  Past Surgical History:   Procedure Laterality Date    INJECTION OF FACET JOINT Bilateral 4/29/2022    Procedure: INJECTION, FACET JOINT, BILATERAL, L4-L5 and L5-S1;  Surgeon: Fab Colbert MD;  Location: [facility];  Service: Pain Management;  Laterality: Bilateral;     Social History     Socioeconomic History    Marital status:    Tobacco Use    Smoking status: Former Smoker     Types: Cigarettes    Smokeless tobacco: Former User     Quit date: 2012   Substance and Sexual Activity    Alcohol use: Never     Comment: occassionally    Drug use: Never    Sexual activity: Yes     Partners: Female     Birth control/protection: None     History reviewed. No pertinent family history.    Review of patient's allergies indicates:  No Known Allergies    Current Outpatient Medications   Medication Sig    lisinopriL (PRINIVIL,ZESTRIL) 20 MG tablet Take 20 mg by mouth once daily.    naproxen (NAPROSYN) 500 MG tablet      No current facility-administered medications for this visit.       REVIEW OF  "SYSTEMS:    GENERAL:  No weight loss, malaise or fevers.  HEENT:  Negative for frequent or significant headaches.  NECK:  Negative for lumps, goiter, pain and significant neck swelling.  RESPIRATORY:  Negative for cough, wheezing or shortness of breath.  CARDIOVASCULAR:  Negative for chest pain, leg swelling or palpitations.  GI:  Negative for abdominal discomfort, blood in stools or black stools or change in bowel habits.  MUSCULOSKELETAL:  See HPI.  SKIN:  Negative for lesions, rash, and itching.  PSYCH:  Negative for sleep disturbance, mood disorder and recent psychosocial stressors.  HEMATOLOGY/LYMPHOLOGY:  Negative for prolonged bleeding, bruising easily or swollen nodes.  NEURO:   No history of headaches, syncope, paralysis, seizures or tremors.  All other reviewed and negative other than HPI.    OBJECTIVE:    BP (!) 139/98 (BP Location: Right arm, Patient Position: Lying, BP Method: Large (Automatic))   Pulse 76   Resp 18   Ht 5' 7" (1.702 m)   Wt 85.7 kg (189 lb)   BMI 29.60 kg/m²     GEN:  Well developed, well nourished.  No acute distress.   HEENT:  No trauma.  Mucous membranes moist.  Nares patent bilaterally.  PSYCH: Normal affect. Thought content appropriate.  CHEST:  Breathing symmetric.  No audible wheezing.  ABD: Soft, non-distended.  SKIN:  Warm, pink, dry.  No rash on exposed areas.    EXT:  No cyanosis, clubbing, or edema.  No color change or changes in nail or hair growth.  NEURO/MUSCULOSKELETAL:  Fully alert, oriented, and appropriate. Speech normal trevor. No cranial nerve deficits.   Gait: Antalgic.  No focal motor deficits.   Lumbar: +slump Bilaterally, +facet loading       ASSESSMENT: 42 y.o. year old male with neck and back pain, consistent with:     1. Lumbar disc disease  Procedure Order to Pain Management   2. Annular tear of lumbar disc  Procedure Order to Pain Management         PLAN:     - Patient has had long standing low back pain consistent with facet arthropathy. Failed " conservative measures.   - s/p B L4-5&L5-S1 Facet joint injection without even temporary relief  - He will restart PT as there was a lapse being out of town.  - He does have annular tear at L5/S1, will proceed and s/f B S1 TFESI  - Counseled patient regarding the importance of activity modification, constant sleeping habits and physical therapy.  - RTC 2 weeks after intervention for re-evaluation    The above plan and management options were discussed at length with patient. Patient is in agreement with the above and verbalized understanding. It will be communicated with the referring physician via electronic record, fax, or mail.    Luis Enrique Gillespie  07/26/2022     I spent a total of 30 minutes on the day of the visit.  This includes face to face time and non-face to face time preparing to see the patient by reviewing previous labs/imaging, obtaining and/or reviewing separately obtained history, documenting clinical information in the electronic or other health record, independently interpreting results and communicating results to the patient/family/caregiver.

## 2022-07-26 ENCOUNTER — TELEPHONE (OUTPATIENT)
Dept: ADMINISTRATIVE | Facility: OTHER | Age: 42
End: 2022-07-26
Payer: OTHER GOVERNMENT

## 2022-07-28 ENCOUNTER — TELEPHONE (OUTPATIENT)
Dept: ADMINISTRATIVE | Facility: OTHER | Age: 42
End: 2022-07-28
Payer: OTHER GOVERNMENT

## 2022-07-29 ENCOUNTER — TELEPHONE (OUTPATIENT)
Dept: ADMINISTRATIVE | Facility: OTHER | Age: 42
End: 2022-07-29
Payer: OTHER GOVERNMENT

## (undated) DEVICE — DRESSING LEUKOPLAST FLEX 1X3IN